# Patient Record
Sex: FEMALE | Race: WHITE | NOT HISPANIC OR LATINO | URBAN - METROPOLITAN AREA
[De-identification: names, ages, dates, MRNs, and addresses within clinical notes are randomized per-mention and may not be internally consistent; named-entity substitution may affect disease eponyms.]

---

## 2019-08-17 ENCOUNTER — EMERGENCY (EMERGENCY)
Facility: HOSPITAL | Age: 29
LOS: 1 days | Discharge: HOME | End: 2019-08-17
Admitting: EMERGENCY MEDICINE
Payer: COMMERCIAL

## 2019-08-17 VITALS
SYSTOLIC BLOOD PRESSURE: 116 MMHG | RESPIRATION RATE: 18 BRPM | OXYGEN SATURATION: 98 % | TEMPERATURE: 98 F | HEART RATE: 83 BPM | DIASTOLIC BLOOD PRESSURE: 75 MMHG

## 2019-08-17 PROCEDURE — 99284 EMERGENCY DEPT VISIT MOD MDM: CPT

## 2019-08-17 RX ORDER — FAMOTIDINE 10 MG/ML
20 INJECTION INTRAVENOUS ONCE
Refills: 0 | Status: COMPLETED | OUTPATIENT
Start: 2019-08-17 | End: 2019-08-17

## 2019-08-17 RX ORDER — DIPHENHYDRAMINE HCL 50 MG
25 CAPSULE ORAL ONCE
Refills: 0 | Status: COMPLETED | OUTPATIENT
Start: 2019-08-17 | End: 2019-08-17

## 2019-08-17 RX ADMIN — FAMOTIDINE 20 MILLIGRAM(S): 10 INJECTION INTRAVENOUS at 23:59

## 2019-08-17 RX ADMIN — Medication 125 MILLIGRAM(S): at 23:59

## 2019-08-17 RX ADMIN — Medication 25 MILLIGRAM(S): at 23:59

## 2019-08-17 NOTE — ED ADULT TRIAGE NOTE - CHIEF COMPLAINT QUOTE
biba for allergic rx to unknown substance since this morning. Took 50mg benadryl at 9pm. Presents with generalized hives and itching, no airway involvement.

## 2019-08-18 LAB — HCG UR QL: NEGATIVE — SIGNIFICANT CHANGE UP

## 2019-08-18 RX ORDER — EPINEPHRINE 0.3 MG/.3ML
0.3 INJECTION INTRAMUSCULAR; SUBCUTANEOUS
Qty: 1 | Refills: 0
Start: 2019-08-18

## 2019-08-18 RX ORDER — DIPHENHYDRAMINE HCL 50 MG
1 CAPSULE ORAL
Qty: 30 | Refills: 0
Start: 2019-08-18

## 2019-08-18 NOTE — ED ADULT NURSE NOTE - CHPI ED NUR SYMPTOMS NEG
no difficulty swallowing/no swelling of face, tongue/no shortness of breath/no throat itching/no vomiting/no difficulty breathing/no nausea/no wheezing

## 2019-08-18 NOTE — ED PROVIDER NOTE - NS ED ROS FT
Denies fevers, chills, nausea, vomiting, diarrhea, constipation, abdominal pain, urinary symptoms, chest pain, palpitations, shortness of breath, dyspnea on exertion, cough/URI symptoms, headache, weakness, numbness, focal deficits, visual changes, gait or balance changes, dizziness

## 2019-08-18 NOTE — ED PROVIDER NOTE - CARE PROVIDER_API CALL
Ignacia James)  Allergy and Immunology; Pediatric Pulmonary Medicine; Pediatrics  [address] [address]  Phone: [phone]  Fax: [phone]  Follow Up Time:

## 2019-08-18 NOTE — ED PROVIDER NOTE - CLINICAL SUMMARY MEDICAL DECISION MAKING FREE TEXT BOX
30 y/o F presents to the ED with hives x 1 days and syncopal episode. Will give Benadryl, Pepcid and Solu-Medrol. Will observe. 28 y/o F presents to the ED with hives/urticaria to entire body and face x 1 days. No known allergies. Unknown cause. Will give Benadryl, Pepcid and Solu-Medrol. Will observe. of note pt also had syncopal event at home after standing suddenly after taking 50mg Benadryl. Witnessed by friend- immediately regained [id number]- asymptomatic since that time. Normal neuro exam- no HA

## 2019-08-18 NOTE — ED PROVIDER NOTE - PROGRESS NOTE DETAILS
Rash completely resolved with meds. Pt ambulating around Ed with normal gait. Vitals are WNL. Orthostatics are normal. Pt requesting to leave- after 2 hour observation. No signs of further allergic reaction. Will Dc at this time with strict return precautions. Will give f/u with allergist

## 2019-08-18 NOTE — ED PROVIDER NOTE - OBJECTIVE STATEMENT
28 y/o F                             PMHx: None, no known allergies    Presents to the ED c/o an allergic reaction to an unknown substance x 1 day and syncopal episode earlier today.   States that she woke up yesterday morning and noted an onset of sx, woke up later that morning and reported sx to have subsided. Now notes recurring sx with no relief. Pt notes hives to her face, trunk, upper and lower extremities, described as a redness, itchy and burning in quality, with no associated sx. Took 2 caps of Benadryl, last taken at 9:30 PM. Pt also reports feeling nauseous earlier today and thereafter syncopizing, hitting her face on the table resulting in a lip abrasion. Episode was witnessed by friend. Denies any fevers, chills, vomiting, abdominal pain, dizziness, chest pain, throat itching, throat swelling, facial swelling, SOB, wheezing, HA, neck pain. 28 y/o F                             PMHx: None, no known allergies    Presents to the ED c/o an allergic reaction to an unknown substance x 1 day and syncopal episode earlier today.   States that she woke up yesterday morning with pruritic rash to entire body- took benadryl and rash resolved on its own.  Now notes recurring sx with no relief from benadryl which she took this afternoon. Pt notes hives to her face, trunk, upper and lower extremities, described as a redness, itchy and burning in quality, with no associated sx. Took 2 caps of Benadryl, last taken at 9:30 PM. Pt also reports feeling lightheaded after suddenly rising after taking 50mg of Benadryl and thereafter synopsizing, cutting her lip on a table edge- resulting in a lip abrasion. Episode was witnessed by friend. States she immediately regained consciousness. States all sxs of lightheadedness have since resolved. Denies any fevers, chills, vomiting, abdominal pain, dizziness, chest pain, throat itching, throat swelling, facial swelling, SOB, wheezing, HA, neck pain.

## 2019-08-18 NOTE — ED PROVIDER NOTE - PHYSICAL EXAMINATION
VITAL SIGNS: I have reviewed nursing notes and confirm.  CONSTITUTIONAL: Well-developed; well-nourished; in no acute distress.  SKIN: Diffused urticaria to face, trunk, B/L upper and lower extremities sparing palmar surfaces, pruritic, blanching.  HEAD: Normocephalic; atraumatic.  EYES: PERRL, EOM intact; conjunctiva and sclera clear.  ENT: No nasal discharge; airway clear. Tonsils normal size, no posterior oropharynx edema. Speaking in full sentence, tolerating     secretions, moving air well.   NECK: Supple; non tender.  CARD: S1, S2 normal; no murmurs, gallops, or rubs. Regular rate and rhythm.  RESP: No wheezes, rales or rhonchi.  ABD: Normal bowel sounds; soft; non-distended; non-tender; no hepatosplenomegaly.  EXT: Normal ROM. No clubbing, cyanosis or edema.  NEURO: Alert, oriented. Grossly unremarkable.  PSYCH: Cooperative, appropriate. VITAL SIGNS: I have reviewed nursing notes and confirm.  CONSTITUTIONAL: Well-developed; well-nourished; in no acute distress.  SKIN: Diffused urticaria to face, trunk, B/L upper and lower extremities sparing palmar surfaces, pruritic, blanching.  HEAD: Normocephalic. Small abrasion to lower lip. no dental tenderness or jaw tenderness.   EYES: PERRL, EOM intact; conjunctiva and sclera clear.  ENT: No nasal discharge; airway clear. Tonsils normal size, no posterior oropharynx edema. Speaking in full sentence, tolerating     secretions, moving air well.   NECK: Supple; non tender.  CARD: S1, S2 normal; no murmurs, gallops, or rubs. Regular rate and rhythm.  RESP: No wheezes, rales or rhonchi.  ABD: Normal bowel sounds; soft; non-distended; non-tender; no hepatosplenomegaly.  EXT: Normal ROM. No clubbing, cyanosis or edema.  NEURO: Alert, oriented. Grossly unremarkable. CNs intact. Normal Romberg. Normal finger to nose. No pronator drift. Normal rapi alternating movements/heal to shin. Sensation intact to all extremities. 5/5 strength to all extremities.   PSYCH: Cooperative, appropriate.

## 2019-08-18 NOTE — ED PROVIDER NOTE - NSFOLLOWUPINSTRUCTIONS_ED_ALL_ED_FT
-PLEASE FOLLOW-UP WITH YOUR PRIMARY CARE DOCTOR IN 1-2 DAYS.  BRING ALL PAPERWORK FROM TODAY'S VISIT TO YOUR FOLLOW-UP VISIT.  IF YOU DO NOT HAVE A PRIMARY CARE DOCTOR PLEASE REFER TO THE OFFICE/CLINIC INFORMATION GIVEN ABOVE.  YOU MAY ALSO CALL [phone] AND ASK FOR MSRenee ABEBE NAVARRETE.  SHE CAN HELP YOU MAKE A FOLLOW-UP APPOINTMENT.  HER HOURS ARE 11AM-7PM MONDAY - FRIDAY.  -PLEASE RETURN TO THE ER IMMEDIATELY OR CALL 911 FOR ANY HIGH FEVER, TROUBLE BREATHING, VOMITING, SEVERE PAIN, OR ANY OTHER CONCERNS.     Allergic Reaction    An allergic reaction is an abnormal reaction to a substance (allergen) by the body's defense system. Common allergens include medicines, food, insect bites or stings, and blood products. The body releases certain proteins into the blood that can cause a variety of symptoms such as an itchy rash, wheezing, swelling of the face/lips/tongue/throat, abdominal pain, nausea or vomiting. An allergic reaction is usually treated with medication. If your health care provider prescribed you an epinephrine injection device, make sure to keep it with you at all times.    SEEK IMMEDIATE MEDICAL CARE IF YOU HAVE ANY OF THE FOLLOWING SYMPTOMS: allergic reaction severe enough that required you to use epinephrine, tightness in your chest, swelling around your lips/tongue/throat, abdominal pain, vomiting or diarrhea, or lightheadedness/dizziness. These symptoms may represent a serious problem that is an emergency. Do not wait to see if the symptoms will go away. Use your auto-injector pen or anaphylaxis kit as you have been instructed. Call 911 and do not drive yourself to the hospital.

## 2019-08-19 PROBLEM — Z78.9 OTHER SPECIFIED HEALTH STATUS: Chronic | Status: ACTIVE | Noted: 2019-08-18

## 2019-08-20 DIAGNOSIS — L50.0 ALLERGIC URTICARIA: ICD-10-CM
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
## 2019-08-20 DIAGNOSIS — S00.511A ABRASION OF LIP, INITIAL ENCOUNTER: ICD-10-CM

## 2019-08-20 PROBLEM — Z00.00 ENCOUNTER FOR PREVENTIVE HEALTH EXAMINATION: Status: ACTIVE | Noted: 2019-08-20

## 2019-09-03 ENCOUNTER — APPOINTMENT (OUTPATIENT)
Dept: PEDIATRIC ALLERGY IMMUNOLOGY | Facility: CLINIC | Age: 29
End: 2019-09-03

## 2025-04-12 NOTE — ED ADULT TRIAGE NOTE - HEART RATE (BEATS/MIN)
Pt reports fever at home of 104 F, body aches, and chest pain x16 hours. Pt denies any cough/SOB. Pt reports 5/10 pain.   
83